# Patient Record
Sex: FEMALE | Race: WHITE | NOT HISPANIC OR LATINO | URBAN - METROPOLITAN AREA
[De-identification: names, ages, dates, MRNs, and addresses within clinical notes are randomized per-mention and may not be internally consistent; named-entity substitution may affect disease eponyms.]

---

## 2018-03-15 ENCOUNTER — OUTPATIENT (OUTPATIENT)
Dept: OUTPATIENT SERVICES | Facility: HOSPITAL | Age: 18
LOS: 1 days | End: 2018-03-15

## 2018-06-29 ENCOUNTER — OUTPATIENT (OUTPATIENT)
Dept: OUTPATIENT SERVICES | Facility: HOSPITAL | Age: 18
LOS: 1 days | End: 2018-06-29

## 2020-07-02 ENCOUNTER — APPOINTMENT (OUTPATIENT)
Dept: ULTRASOUND IMAGING | Facility: CLINIC | Age: 20
End: 2020-07-02
Payer: COMMERCIAL

## 2020-07-02 PROCEDURE — 76856 US EXAM PELVIC COMPLETE: CPT

## 2021-06-24 ENCOUNTER — NON-APPOINTMENT (OUTPATIENT)
Age: 21
End: 2021-06-24

## 2021-06-24 ENCOUNTER — APPOINTMENT (OUTPATIENT)
Dept: OBGYN | Facility: CLINIC | Age: 21
End: 2021-06-24
Payer: COMMERCIAL

## 2021-06-24 VITALS
WEIGHT: 160 LBS | HEIGHT: 65 IN | DIASTOLIC BLOOD PRESSURE: 70 MMHG | SYSTOLIC BLOOD PRESSURE: 110 MMHG | BODY MASS INDEX: 26.66 KG/M2

## 2021-06-24 DIAGNOSIS — Z78.9 OTHER SPECIFIED HEALTH STATUS: ICD-10-CM

## 2021-06-24 DIAGNOSIS — E73.9 LACTOSE INTOLERANCE, UNSPECIFIED: ICD-10-CM

## 2021-06-24 DIAGNOSIS — Z72.89 OTHER PROBLEMS RELATED TO LIFESTYLE: ICD-10-CM

## 2021-06-24 DIAGNOSIS — Z80.8 FAMILY HISTORY OF MALIGNANT NEOPLASM OF OTHER ORGANS OR SYSTEMS: ICD-10-CM

## 2021-06-24 PROCEDURE — 99385 PREV VISIT NEW AGE 18-39: CPT

## 2021-06-24 RX ORDER — MULTIVITAMIN
TABLET ORAL
Refills: 0 | Status: ACTIVE | COMMUNITY

## 2021-06-24 RX ORDER — ERGOCALCIFEROL 1.25 MG/1
1.25 MG CAPSULE, LIQUID FILLED ORAL
Refills: 0 | Status: ACTIVE | COMMUNITY

## 2021-06-24 NOTE — DISCUSSION/SUMMARY
[FreeTextEntry1] : 20yo G0 LMP 6/13/2021 inquring about LARC today. \par \par LARC: \par - Pt given pamphlets today to decide which she desires. RBA of each reviewed with her in detail today \par - RTO ASAP to discuss plan \par \par RHM: \par - DVSneg x 3\par - Dentist, PCP, Derm as discussed \par - Offered HPV vacc today. Pt not sure if she's had it -- phamplet given today \par - Offered STI screen today. Pt declined. Standard GC/CT done today \par - Encouraged healthy diet, exercise, weight miant. \par \par Zuleyka Brown MD \par Obstetrician/Gynecologist\par \par

## 2021-06-24 NOTE — COUNSELING
[Nutrition/ Exercise/ Weight Management] : nutrition, exercise, weight management [Vitamins/Supplements] : vitamins/supplements [Sunscreen] : sunscreen [Drugs/Alcohol] : drugs, alcohol [Contraception/ Emergency Contraception/ Safe Sexual Practices] : contraception, emergency contraception, safe sexual practices [Intimate Partner Violence] : intimate partner violence [STD (testing, results, tx)] : STD (testing, results, tx) [HPV Vaccine] : HPV Vaccine

## 2021-06-24 NOTE — HISTORY OF PRESENT ILLNESS
[Patient reported PAP Smear was normal] : Patient reported PAP Smear was normal [FreeTextEntry1] : 20yo G0 LMP 6/13/2021 Not on BCM (but is inquiring about IUD today) here for new GYN intake and BCM management.  [PapSmeardate] : 2020

## 2021-08-20 LAB
C TRACH RRNA SPEC QL NAA+PROBE: NOT DETECTED
CYTOLOGY CVX/VAG DOC THIN PREP: NORMAL
N GONORRHOEA RRNA SPEC QL NAA+PROBE: NOT DETECTED
SOURCE TP AMPLIFICATION: NORMAL

## 2021-09-24 ENCOUNTER — APPOINTMENT (OUTPATIENT)
Dept: OBGYN | Facility: CLINIC | Age: 21
End: 2021-09-24
Payer: COMMERCIAL

## 2021-09-24 VITALS
WEIGHT: 160 LBS | BODY MASS INDEX: 26.66 KG/M2 | HEIGHT: 65 IN | DIASTOLIC BLOOD PRESSURE: 62 MMHG | SYSTOLIC BLOOD PRESSURE: 110 MMHG

## 2021-09-24 DIAGNOSIS — Z00.00 ENCOUNTER FOR GENERAL ADULT MEDICAL EXAMINATION W/OUT ABNORMAL FINDINGS: ICD-10-CM

## 2021-09-24 PROCEDURE — 99213 OFFICE O/P EST LOW 20 MIN: CPT

## 2021-09-24 NOTE — HISTORY OF PRESENT ILLNESS
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  \par \par She does report having intense discomfort after having unprotected intercourse with her new partner 20 days ago. She says it lasted about a week and she used and OTC vaginal medicine to help it. She denies any d/c, bleeding or odor.  \par \par

## 2021-09-24 NOTE — DISCUSSION/SUMMARY
[FreeTextEntry1] : 22yo G0 LMP 9/16 with now resolved vag discomfort after unprotected intercourse with a new partner. \par \par - STI testing done today \par - Pt to RTO on 10/6 for IUD placement. Ok to resume swimming on 10/16\par - Pt aware to abstain for sex (protected or unproteced for 2 wks prior to IUD placement and for 2 wks after) \par - Pt instructed to use condoms with sexual encounters as the IUD is 0% effective against STIs and not 100% effective again pregnancy. \par - Pt verbalized understanding\par - All questions were solicited and answered \par \par Zuleyka Brown MD \par Obstetrician/Gynecologist\par

## 2021-09-27 ENCOUNTER — TRANSCRIPTION ENCOUNTER (OUTPATIENT)
Age: 21
End: 2021-09-27

## 2021-10-06 ENCOUNTER — NON-APPOINTMENT (OUTPATIENT)
Age: 21
End: 2021-10-06

## 2021-10-06 ENCOUNTER — APPOINTMENT (OUTPATIENT)
Dept: OBGYN | Facility: CLINIC | Age: 21
End: 2021-10-06
Payer: COMMERCIAL

## 2021-10-06 LAB
C TRACH RRNA SPEC QL NAA+PROBE: NOT DETECTED
CANDIDA VAG CYTO: NOT DETECTED
G VAGINALIS+PREV SP MTYP VAG QL MICRO: NOT DETECTED
HBV SURFACE AB SER QL: NONREACTIVE
HBV SURFACE AG SER QL: NONREACTIVE
HCG UR QL: NEGATIVE
HCG UR QL: NEGATIVE
HCV AB SER QL: NONREACTIVE
HCV S/CO RATIO: 0.11 S/CO
HIV1+2 AB SPEC QL IA.RAPID: NONREACTIVE
HSV 1+2 IGG SER IA-IMP: NEGATIVE
HSV 1+2 IGG SER IA-IMP: NEGATIVE
HSV1 IGG SER QL: 0.19 INDEX
HSV1 IGM SER QL: NEGATIVE
HSV2 AB FLD-ACNC: NEGATIVE
HSV2 IGG SER QL: 0.1 INDEX
N GONORRHOEA RRNA SPEC QL NAA+PROBE: NOT DETECTED
QUALITY CONTROL: YES
QUALITY CONTROL: YES
SOURCE AMPLIFICATION: NORMAL
T PALLIDUM AB SER QL IA: NEGATIVE
T VAGINALIS VAG QL WET PREP: NOT DETECTED

## 2021-10-06 PROCEDURE — 58300 INSERT INTRAUTERINE DEVICE: CPT

## 2021-10-06 PROCEDURE — 81025 URINE PREGNANCY TEST: CPT

## 2021-10-06 NOTE — PROCEDURE
[IUD Placement] : intrauterine device (IUD) placement [Time out performed] : Pre-procedure time out performed.  Patient's name, date of birth and procedure confirmed. [Consent Obtained] : Consent obtained [Prevention of Pregnancy] : prevention of pregnancy [Risks] : risks [Benefits] : benefits [Alternatives] : alternatives [Patient] : patient [Infection] : infection [Bleeding] : bleeding [Pain] : pain [Expulsion] : expulsion [Failure] : failure [Neg Pregnancy Test] : negative pregnancy test [Betadine] : Betadine [Tenaculum] : Tenaculum [Easy Passage] : Easy passage [Sounded to ___ cm] : sounded to [unfilled] ~Ucm [ParaGard IUD] : ParaGard IUD [Tolerated Well] : Patient tolerated the procedure well [No Complications] : No complications [Excessive Bleeding] : excessive bleeding was noted [Uterine Perforation] : uterine perforation [None] : None [Motrin/Ibuprofen] : Motrin/Ibuprofen [LMPDate] : 1 month ago  [de-identified] : [id number] [de-identified] : April 2021  [de-identified] : 10/2031 [de-identified] : 800mg motrin PO prior to leaving.

## 2021-10-07 ENCOUNTER — NON-APPOINTMENT (OUTPATIENT)
Age: 21
End: 2021-10-07

## 2021-10-07 RX ORDER — COPPER 313.4 MG/1
INTRAUTERINE DEVICE INTRAUTERINE
Refills: 0 | Status: COMPLETED | OUTPATIENT
Start: 2021-10-07

## 2021-10-07 RX ADMIN — COPPER 0: 313.4 INTRAUTERINE DEVICE INTRAUTERINE at 00:00

## 2021-12-03 ENCOUNTER — ASOB RESULT (OUTPATIENT)
Age: 21
End: 2021-12-03

## 2021-12-03 ENCOUNTER — APPOINTMENT (OUTPATIENT)
Dept: OBGYN | Facility: CLINIC | Age: 21
End: 2021-12-03
Payer: COMMERCIAL

## 2021-12-03 VITALS
SYSTOLIC BLOOD PRESSURE: 140 MMHG | HEIGHT: 65 IN | WEIGHT: 151 LBS | DIASTOLIC BLOOD PRESSURE: 60 MMHG | BODY MASS INDEX: 25.16 KG/M2

## 2021-12-03 DIAGNOSIS — B37.3 CANDIDIASIS OF VULVA AND VAGINA: ICD-10-CM

## 2021-12-03 PROCEDURE — 76830 TRANSVAGINAL US NON-OB: CPT

## 2021-12-03 PROCEDURE — 99212 OFFICE O/P EST SF 10 MIN: CPT

## 2021-12-03 PROCEDURE — 76856 US EXAM PELVIC COMPLETE: CPT | Mod: 59

## 2021-12-03 NOTE — DISCUSSION/SUMMARY
[FreeTextEntry1] : 22yo G0 LMP 11/14 s/p Paragard placement with ? yeast on exam and ov cyst. Pt taught how to check strings today \par \par Ov cyst: \par - Torsion and rupture precautions reviewed \par - RTO in 6wks for sono and visit \par \par ? yeast: \par - Diflucan rx'd \par - BD affrim and GC/Ct ordered \par \par Zuleyka Brown MD \par Obstetrician/Gynecologist\par

## 2021-12-03 NOTE — HISTORY OF PRESENT ILLNESS
[FreeTextEntry1] : 20yo G0 LMP 11/14 s/p Paragard placement is here for follow up. She reports her last period was longer and heavier than usual. She also notes some clear, non odorous d/c as well.  No new sexual partners. \par \par Her sono shows the IUD in correct position and she has two 2cm simple L ov cysts.

## 2021-12-03 NOTE — PHYSICAL EXAM
[Labia Majora] : normal [Labia Minora] : normal [Discharge] : a  ~M vaginal discharge was present [White] : white [Cheesy] : cheesy [IUD String] : an IUD string was noted [Normal] : normal [Uterine Adnexae] : normal

## 2021-12-07 LAB
C TRACH RRNA SPEC QL NAA+PROBE: NOT DETECTED
CANDIDA VAG CYTO: NOT DETECTED
G VAGINALIS+PREV SP MTYP VAG QL MICRO: NOT DETECTED
N GONORRHOEA RRNA SPEC QL NAA+PROBE: NOT DETECTED
SOURCE AMPLIFICATION: NORMAL
T VAGINALIS VAG QL WET PREP: NOT DETECTED

## 2022-01-14 ENCOUNTER — ASOB RESULT (OUTPATIENT)
Age: 22
End: 2022-01-14

## 2022-01-14 ENCOUNTER — APPOINTMENT (OUTPATIENT)
Dept: OBGYN | Facility: CLINIC | Age: 22
End: 2022-01-14
Payer: COMMERCIAL

## 2022-01-14 PROCEDURE — 76830 TRANSVAGINAL US NON-OB: CPT

## 2022-01-14 PROCEDURE — 76856 US EXAM PELVIC COMPLETE: CPT | Mod: 59

## 2022-01-18 ENCOUNTER — NON-APPOINTMENT (OUTPATIENT)
Age: 22
End: 2022-01-18

## 2022-01-20 ENCOUNTER — APPOINTMENT (OUTPATIENT)
Dept: OBGYN | Facility: CLINIC | Age: 22
End: 2022-01-20
Payer: COMMERCIAL

## 2022-01-20 VITALS
SYSTOLIC BLOOD PRESSURE: 122 MMHG | WEIGHT: 153 LBS | BODY MASS INDEX: 25.49 KG/M2 | HEIGHT: 65 IN | DIASTOLIC BLOOD PRESSURE: 70 MMHG

## 2022-01-20 PROCEDURE — 81025 URINE PREGNANCY TEST: CPT

## 2022-01-20 PROCEDURE — 90649 4VHPV VACCINE 3 DOSE IM: CPT

## 2022-01-20 PROCEDURE — 90471 IMMUNIZATION ADMIN: CPT

## 2022-01-20 PROCEDURE — 99212 OFFICE O/P EST SF 10 MIN: CPT | Mod: 25

## 2022-01-20 NOTE — HISTORY OF PRESENT ILLNESS
[FreeTextEntry1] : 22yo G0 LMP 11/14 s/p Paragard placement is here for follow up of L ov cysts. She had a sono which demonstrates resolution of one. Her IUD remains in normal position. She had no issues with her IUD aside from a little more cramping and bleeding than is normal but does not sound excessive.  \par \par She also desires the HPV vaccine. \par

## 2022-01-20 NOTE — DISCUSSION/SUMMARY
[FreeTextEntry1] : 20yo Go LMP 1/15 with one persistent simple L ov cyst.  UCG neg today \par \par L ov cyst: \par - Ov cyst: \par - Torsion and rupture precautions reviewed \par - RTO in 8 wks for repeat sono \par \par HPV shot: \par - Lot # [id number]/ expires: 8/18/2023 \par - RTO in 2 months for next dose \par - Precautions reviewed \par \par Zuleyka Brown MD \par Obstetrician/Gynecologist\par

## 2022-03-08 LAB
HCG UR QL: NEGATIVE
QUALITY CONTROL: YES

## 2022-03-21 ENCOUNTER — APPOINTMENT (OUTPATIENT)
Dept: OBGYN | Facility: CLINIC | Age: 22
End: 2022-03-21
Payer: COMMERCIAL

## 2022-03-21 ENCOUNTER — ASOB RESULT (OUTPATIENT)
Age: 22
End: 2022-03-21

## 2022-03-21 VITALS
WEIGHT: 153 LBS | DIASTOLIC BLOOD PRESSURE: 74 MMHG | SYSTOLIC BLOOD PRESSURE: 118 MMHG | HEIGHT: 65 IN | BODY MASS INDEX: 25.49 KG/M2

## 2022-03-21 PROCEDURE — 76830 TRANSVAGINAL US NON-OB: CPT

## 2022-03-21 PROCEDURE — 76856 US EXAM PELVIC COMPLETE: CPT | Mod: 59

## 2022-03-21 PROCEDURE — 90649 4VHPV VACCINE 3 DOSE IM: CPT

## 2022-03-21 PROCEDURE — 90471 IMMUNIZATION ADMIN: CPT

## 2022-05-23 ENCOUNTER — APPOINTMENT (OUTPATIENT)
Dept: OBGYN | Facility: CLINIC | Age: 22
End: 2022-05-23

## 2022-06-13 ENCOUNTER — APPOINTMENT (OUTPATIENT)
Dept: OBGYN | Facility: CLINIC | Age: 22
End: 2022-06-13

## 2022-06-13 VITALS
BODY MASS INDEX: 25.99 KG/M2 | DIASTOLIC BLOOD PRESSURE: 60 MMHG | SYSTOLIC BLOOD PRESSURE: 110 MMHG | HEIGHT: 65 IN | WEIGHT: 156 LBS

## 2022-06-13 DIAGNOSIS — Z23 ENCOUNTER FOR IMMUNIZATION: ICD-10-CM

## 2022-06-13 PROCEDURE — 90471 IMMUNIZATION ADMIN: CPT

## 2022-06-13 PROCEDURE — 99213 OFFICE O/P EST LOW 20 MIN: CPT | Mod: 25

## 2022-06-13 PROCEDURE — 90649 4VHPV VACCINE 3 DOSE IM: CPT

## 2022-06-13 NOTE — HISTORY OF PRESENT ILLNESS
[FreeTextEntry1] : Patient is here for second Gardisil vaccination today.  She also has concerns about Paragard IUD which was placed 6 months ago.  She does have some irregular bleeding and spotting.  She does not find this unmanageable but is somewhat bothered by it.

## 2022-06-13 NOTE — REVIEW OF SYSTEMS
[Patient Intake Form Reviewed] : Patient intake form was reviewed [Abn Vaginal bleeding] : abnormal vaginal bleeding [FreeTextEntry8] : irregular spotting, longer menses since Paragard insertion

## 2022-06-13 NOTE — PLAN
[FreeTextEntry1] : We discussed irregular and heavier bleeding and cramping with Paragard. We discussed the possibility of switching to Kyleena or a POP if she is anxious about estrogen and given brochure on Kyleena.  She may also try 800 mg Ibuprofen q 8 hours with heavy menstrual bleeding. Patient will consider options and get back to me if she opts for alternate method of contraceptive.

## 2022-06-20 ENCOUNTER — APPOINTMENT (OUTPATIENT)
Dept: OBGYN | Facility: CLINIC | Age: 22
End: 2022-06-20
Payer: COMMERCIAL

## 2022-06-20 VITALS
WEIGHT: 156 LBS | DIASTOLIC BLOOD PRESSURE: 64 MMHG | HEIGHT: 65 IN | SYSTOLIC BLOOD PRESSURE: 110 MMHG | BODY MASS INDEX: 25.99 KG/M2

## 2022-06-20 LAB
HCG UR QL: NEGATIVE
QUALITY CONTROL: YES

## 2022-06-20 PROCEDURE — 81025 URINE PREGNANCY TEST: CPT

## 2022-06-20 PROCEDURE — 58301 REMOVE INTRAUTERINE DEVICE: CPT

## 2022-06-20 PROCEDURE — 58300 INSERT INTRAUTERINE DEVICE: CPT

## 2022-06-20 NOTE — PROCEDURE
[IUD Placement] : intrauterine device (IUD) placement [Time out performed] : Pre-procedure time out performed.  Patient's name, date of birth and procedure confirmed. [Consent Obtained] : Consent obtained [Prevention of Pregnancy] : prevention of pregnancy [Abnormal Uterine Bleeding] : abnormal uterine bleeding [Emergency Contraception] : emergency contraception [Infection] : infection [Bleeding] : bleeding [Pain] : pain [Expulsion] : expulsion [Failure] : failure [Uterine Perforation] : uterine perforation [No Premedication] : No premedication [Ring Forceps] : Ring forceps [Easy Passage] : Easy passage [Sounded to ___ cm] : sounded to [unfilled] ~Ucm [Kyleena IUD] : Kyleena IUD [Motrin/Ibuprofen] : Motrin/Ibuprofen [IUD Removal] : intrauterine device (IUD) removal [Risks] : risks [Benefits] : benefits [Alternatives] : alternatives [Patient] : patient [Speculum Placed] : speculum placed [IUD Removed - Forceps] : IUD removed - forceps [Tolerated Well] : Patient tolerated the procedure well [No Complications] : no complications [Heavy Vaginal Bleeding] : for heavy vaginal bleeding [Pelvic Pain] : for pelvic pain [LMPDate] : 6/20/22 [de-identified] : [id number] [de-identified] : Change to Kyleena IUD

## 2022-06-21 LAB
C TRACH RRNA SPEC QL NAA+PROBE: NOT DETECTED
N GONORRHOEA RRNA SPEC QL NAA+PROBE: NOT DETECTED
SOURCE AMPLIFICATION: NORMAL

## 2022-07-05 ENCOUNTER — NON-APPOINTMENT (OUTPATIENT)
Age: 22
End: 2022-07-05

## 2022-07-19 NOTE — DISCUSSION/SUMMARY
[FreeTextEntry1] : Gardisil 9  vaccine IM left bicep.  Lot# [id number], Exp Date8/12/23.  Patient tolerated well.  Third injection scheduled for 12 weeks from today.

## 2022-07-19 NOTE — HISTORY OF PRESENT ILLNESS
[FreeTextEntry1] : 21 yo presents for second HPV injection.  First injection tolerated well and no adverse reaction noted.  Also had TV US garett PABON and previously noted ovarian cyst resolved and Paraguard correctly positioned

## 2022-08-01 ENCOUNTER — APPOINTMENT (OUTPATIENT)
Dept: OBGYN | Facility: CLINIC | Age: 22
End: 2022-08-01

## 2022-08-01 VITALS
SYSTOLIC BLOOD PRESSURE: 118 MMHG | WEIGHT: 156 LBS | BODY MASS INDEX: 25.99 KG/M2 | HEIGHT: 65 IN | DIASTOLIC BLOOD PRESSURE: 80 MMHG

## 2022-08-01 DIAGNOSIS — Z01.419 ENCOUNTER FOR GYNECOLOGICAL EXAMINATION (GENERAL) (ROUTINE) W/OUT ABNORMAL FINDINGS: ICD-10-CM

## 2022-08-01 PROCEDURE — 99395 PREV VISIT EST AGE 18-39: CPT

## 2022-08-01 PROCEDURE — 99213 OFFICE O/P EST LOW 20 MIN: CPT | Mod: 25

## 2022-08-01 NOTE — PLAN
[FreeTextEntry1] : Normal CBE and SBE taught and encouraged to perform monthly\par Normal pelvic exam - IUD strings visualized\par Pap not collected as negative last year and no hx abnormal pap smears\par GC/CT/HIV/RPR collected\par Safe sex discussed\par

## 2022-08-01 NOTE — HISTORY OF PRESENT ILLNESS
[IUD] : has an intrauterine device [Y] : Patient is sexually active [Monogamous (Male Partner)] : is monogamous with a male partner [N] : Patient denies prior pregnancies [FreeTextEntry1] : 23 yo here for IUD check.  Kyleena IUD placed on 6/22/20.  Does report increased headaches and some moodiness.  Has an appt with neurology.  Opts to give Kyleena 6 months to see if sx resolve.  Will inform me about neurology consult.  Would like annual exam as well today. [de-identified] : Kyleena

## 2022-08-01 NOTE — DISCUSSION/SUMMARY
[FreeTextEntry1] : Patient is pleased with method.  Normal pelvic exam.  IUD strings visualized\par Will check IUD strings monthly\par RTO x 1 year\par

## 2022-08-02 LAB — HIV1+2 AB SPEC QL IA.RAPID: NONREACTIVE

## 2022-08-04 LAB — RPR SER-TITR: NORMAL

## 2022-11-23 ENCOUNTER — APPOINTMENT (OUTPATIENT)
Dept: OBGYN | Facility: CLINIC | Age: 22
End: 2022-11-23

## 2022-12-22 ENCOUNTER — APPOINTMENT (OUTPATIENT)
Dept: OBGYN | Facility: CLINIC | Age: 22
End: 2022-12-22

## 2022-12-22 ENCOUNTER — OFFICE (OUTPATIENT)
Dept: URBAN - METROPOLITAN AREA CLINIC 9 | Facility: CLINIC | Age: 22
Setting detail: OPHTHALMOLOGY
End: 2022-12-22
Payer: COMMERCIAL

## 2022-12-22 VITALS
DIASTOLIC BLOOD PRESSURE: 80 MMHG | HEIGHT: 65 IN | SYSTOLIC BLOOD PRESSURE: 120 MMHG | BODY MASS INDEX: 31.65 KG/M2 | WEIGHT: 190 LBS

## 2022-12-22 DIAGNOSIS — Z11.3 ENCOUNTER FOR SCREENING FOR INFECTIONS WITH A PREDOMINANTLY SEXUAL MODE OF TRANSMISSION: ICD-10-CM

## 2022-12-22 DIAGNOSIS — R10.2 PELVIC AND PERINEAL PAIN: ICD-10-CM

## 2022-12-22 DIAGNOSIS — H40.013: ICD-10-CM

## 2022-12-22 DIAGNOSIS — Z30.09 ENCOUNTER FOR OTHER GENERAL COUNSELING AND ADVICE ON CONTRACEPTION: ICD-10-CM

## 2022-12-22 PROBLEM — Q04.06 CONGENITAL CEREBRAL CYSTS: Status: ACTIVE | Noted: 2022-12-22

## 2022-12-22 PROBLEM — H52.13 MYOPIA; BOTH EYES: Status: ACTIVE | Noted: 2022-12-22

## 2022-12-22 PROBLEM — G43.009 MIGRAINE-W/O AURA: Status: ACTIVE | Noted: 2022-12-22

## 2022-12-22 PROBLEM — H47.20 OPTIC ATROPHY UNSPECIFIED: Status: ACTIVE | Noted: 2022-12-22

## 2022-12-22 PROCEDURE — 92133 CPTRZD OPH DX IMG PST SGM ON: CPT | Performed by: OPHTHALMOLOGY

## 2022-12-22 PROCEDURE — 92014 COMPRE OPH EXAM EST PT 1/>: CPT | Performed by: OPHTHALMOLOGY

## 2022-12-22 PROCEDURE — 99213 OFFICE O/P EST LOW 20 MIN: CPT | Mod: 25

## 2022-12-22 RX ORDER — NORGESTIMATE AND ETHINYL ESTRADIOL 7DAYSX3 LO
0.18/0.215/0.25 KIT ORAL DAILY
Qty: 3 | Refills: 0 | Status: ACTIVE | COMMUNITY
Start: 2022-12-22 | End: 1900-01-01

## 2022-12-22 RX ORDER — FLUCONAZOLE 150 MG/1
150 TABLET ORAL
Qty: 2 | Refills: 2 | Status: DISCONTINUED | COMMUNITY
Start: 2021-12-03 | End: 2022-12-22

## 2022-12-22 ASSESSMENT — REFRACTION_CURRENTRX
OS_VPRISM_DIRECTION: SV
OS_SPHERE: -2.50
OS_CYLINDER: SPH
OS_OVR_VA: 20/
OD_OVR_VA: 20/
OD_SPHERE: -2.50
OD_CYLINDER: SPH
OD_VPRISM_DIRECTION: SV

## 2022-12-22 ASSESSMENT — REFRACTION_AUTOREFRACTION
OS_CYLINDER: +0.25
OS_SPHERE: -3.00
OD_AXIS: 062
OD_SPHERE: -4.00
OD_CYLINDER: +0.50
OS_AXIS: 096

## 2022-12-22 ASSESSMENT — KERATOMETRY
OD_K2POWER_DIOPTERS: 44.50
OS_AXISANGLE_DEGREES: 090
OS_K1POWER_DIOPTERS: 43.50
METHOD_AUTO_MANUAL: AUTO
OD_AXISANGLE_DEGREES: 084
OS_K2POWER_DIOPTERS: 44.50
OD_K1POWER_DIOPTERS: 43.50

## 2022-12-22 ASSESSMENT — REFRACTION_MANIFEST
OU_VA: 20/20
OS_CYLINDER: SPH
OS_SPHERE: -3.25
OD_VA1: 20/20-
OD_SPHERE: -3.00
OS_CYLINDER: SPH
OS_VA1: 20/20
OS_VA1: 20/20
OD_SPHERE: -3.00
OD_VA1: 20/20-
OU_VA: 20/20
OD_CYLINDER: SPH
OD_CYLINDER: SPH
OS_SPHERE: -3.25

## 2022-12-22 ASSESSMENT — TONOMETRY
OS_IOP_MMHG: 14
OD_IOP_MMHG: 16

## 2022-12-22 ASSESSMENT — SPHEQUIV_DERIVED
OS_SPHEQUIV: -2.875
OD_SPHEQUIV: -3.75

## 2022-12-22 ASSESSMENT — CONFRONTATIONAL VISUAL FIELD TEST (CVF)
OD_FINDINGS: FULL
OS_FINDINGS: FULL

## 2022-12-22 ASSESSMENT — VISUAL ACUITY
OS_BCVA: 20/20
OD_BCVA: 20/20

## 2022-12-22 ASSESSMENT — AXIALLENGTH_DERIVED
OD_AL: 24.9424
OS_AL: 24.5671

## 2022-12-28 NOTE — DISCUSSION/SUMMARY
[FreeTextEntry1] : 21 y/o with IUD removed needing birth control\par Also did w.u for causes of pelvic pain she is currently having-gc/ct and bd affirm\par Exam appeared normal so will not presumptively treat\par REviewed all ocps -decided to try ortho tricyclin since it has low progestin potency and reduces acne\par Will f/u in 3 months for ocp check

## 2022-12-28 NOTE — HISTORY OF PRESENT ILLNESS
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 She was treated for PID up at school although all labs per her were negative. She also notes she feels like she is getting ovarian cysts and facial acne and finally notes an uptick in her depression with the IUD. All together she wants the thing out. She takes an antidepressant daily so she believes she will remember pills\par \par \par \par \par \par \par \par \par \par \par \par \par \par \par \par \par \par \par \par \par \par \par \par \par \par \par \par \par \par \par \par \par \par \par

## 2023-03-13 ENCOUNTER — APPOINTMENT (OUTPATIENT)
Dept: OBGYN | Facility: CLINIC | Age: 23
End: 2023-03-13
Payer: COMMERCIAL

## 2023-03-13 DIAGNOSIS — Z30.431 ENCOUNTER FOR ROUTINE CHECKING OF INTRAUTERINE CONTRACEPTIVE DEVICE: ICD-10-CM

## 2023-03-13 DIAGNOSIS — Z30.430 ENCOUNTER FOR INSERTION OF INTRAUTERINE CONTRACEPTIVE DEVICE: ICD-10-CM

## 2023-03-13 DIAGNOSIS — Z97.5 PRESENCE OF (INTRAUTERINE) CONTRACEPTIVE DEVICE: ICD-10-CM

## 2023-03-13 DIAGNOSIS — Z30.41 ENCOUNTER FOR SURVEILLANCE OF CONTRACEPTIVE PILLS: ICD-10-CM

## 2023-03-13 DIAGNOSIS — F52.8 OTHER SEXUAL DYSFUNCTION NOT DUE TO A SUBSTANCE OR KNOWN PHYSIOLOGICAL CONDITION: ICD-10-CM

## 2023-03-13 PROCEDURE — 99423 OL DIG E/M SVC 21+ MIN: CPT

## 2023-03-13 RX ORDER — ESCITALOPRAM OXALATE 5 MG/1
TABLET, FILM COATED ORAL
Refills: 0 | Status: ACTIVE | COMMUNITY
Start: 2023-03-13

## 2023-03-13 NOTE — PLAN
[FreeTextEntry1] : CHc refills given x 12 months\par ACHEs rev'd\par Pt to f/u with mental health provider to discuss decreased sexual side effects of lexapro\par Water or silicone based vaginal lube, consistent condom use stressed\par \par f/u in 3 months if needed or in 1 year\par \par pap due 6/2024\par Pt expressed understanding and agreement\par \par 29 mins spent with pt\par \par Keira Thompson CM

## 2023-03-13 NOTE — HISTORY OF PRESENT ILLNESS
[Currently Active] : currently active [Men] : men [Vaginal] : vaginal [FreeTextEntry1] : Consent obtained for telehealth visit.\par \par Pt reports she had Kyleena removed 12/22 and switched to Tri-lo sprintec x 3  months.\par Pt reports some vaginal dryness and decreased arousal.\par \par Pt reports she increased Lexapro dose to 30 mg in January of 2023.\par \par we discussed documented side effects of CHC and SSRI.\par \par After long discussion pt decided to continue CHC and will f/u in 3 months if she desires to change her prescription.\par Pt reports she is happy with this method.\par \par Pt will also discuss this with her mental health provider [PapSmeardate] : 2021

## 2023-05-12 ENCOUNTER — OFFICE (OUTPATIENT)
Dept: URBAN - METROPOLITAN AREA CLINIC 38 | Facility: CLINIC | Age: 23
Setting detail: OPHTHALMOLOGY
End: 2023-05-12
Payer: COMMERCIAL

## 2023-05-12 DIAGNOSIS — H47.20: ICD-10-CM

## 2023-05-12 DIAGNOSIS — H52.13: ICD-10-CM

## 2023-05-12 DIAGNOSIS — H40.013: ICD-10-CM

## 2023-05-12 DIAGNOSIS — G43.009: ICD-10-CM

## 2023-05-12 DIAGNOSIS — Q04.3: ICD-10-CM

## 2023-05-12 PROCEDURE — 92133 CPTRZD OPH DX IMG PST SGM ON: CPT | Performed by: OPHTHALMOLOGY

## 2023-05-12 PROCEDURE — 92012 INTRM OPH EXAM EST PATIENT: CPT | Performed by: OPHTHALMOLOGY

## 2023-05-12 ASSESSMENT — REFRACTION_MANIFEST
OD_SPHERE: -3.00
OU_VA: 20/20
OS_VA1: 20/20
OD_CYLINDER: SPH
OS_CYLINDER: SPH
OD_SPHERE: -2.50
OS_CYLINDER: SPH
OD_VA1: 20/20-
OS_SPHERE: -2.50
OD_CYLINDER: SPH
OS_SPHERE: -3.25

## 2023-05-12 ASSESSMENT — REFRACTION_CURRENTRX
OD_OVR_VA: 20/
OS_OVR_VA: 20/
OD_CYLINDER: SPH
OD_SPHERE: -2.50
OS_SPHERE: -2.50
OS_CYLINDER: SPH
OS_VPRISM_DIRECTION: SV
OD_VPRISM_DIRECTION: SV

## 2023-05-12 ASSESSMENT — CONFRONTATIONAL VISUAL FIELD TEST (CVF)
OS_FINDINGS: FULL
OD_FINDINGS: FULL

## 2023-05-12 ASSESSMENT — KERATOMETRY
OS_AXISANGLE_DEGREES: 091
METHOD_AUTO_MANUAL: AUTO
OD_K1POWER_DIOPTERS: 43.75
OS_K1POWER_DIOPTERS: 43.75
OD_K2POWER_DIOPTERS: 45.00
OS_K2POWER_DIOPTERS: 44.75
OD_AXISANGLE_DEGREES: 089

## 2023-05-12 ASSESSMENT — SPHEQUIV_DERIVED: OD_SPHEQUIV: -3.75

## 2023-05-12 ASSESSMENT — VISUAL ACUITY
OD_BCVA: 20/30
OS_BCVA: 20/25

## 2023-05-12 ASSESSMENT — REFRACTION_AUTOREFRACTION
OD_SPHERE: -3.50
OD_AXIS: 179
OD_CYLINDER: -0.50
OS_SPHERE: -3.75
OS_CYLINDER: SPH

## 2023-05-12 ASSESSMENT — AXIALLENGTH_DERIVED: OD_AL: 24.7894

## 2023-05-12 ASSESSMENT — TONOMETRY
OD_IOP_MMHG: 20
OS_IOP_MMHG: 19

## 2024-02-11 RX ORDER — NORGESTIMATE AND ETHINYL ESTRADIOL 7DAYSX3 LO
0.18/0.215/0.25 KIT ORAL DAILY
Qty: 3 | Refills: 0 | Status: ACTIVE | COMMUNITY
Start: 2023-03-13